# Patient Record
Sex: FEMALE | Race: WHITE | NOT HISPANIC OR LATINO
[De-identification: names, ages, dates, MRNs, and addresses within clinical notes are randomized per-mention and may not be internally consistent; named-entity substitution may affect disease eponyms.]

---

## 2019-04-09 ENCOUNTER — RECORD ABSTRACTING (OUTPATIENT)
Age: 39
End: 2019-04-09

## 2019-04-09 DIAGNOSIS — Z80.49 FAMILY HISTORY OF MALIGNANT NEOPLASM OF OTHER GENITAL ORGANS: ICD-10-CM

## 2019-04-09 DIAGNOSIS — Z87.42 PERSONAL HISTORY OF OTHER DISEASES OF THE FEMALE GENITAL TRACT: ICD-10-CM

## 2019-04-09 DIAGNOSIS — Z78.9 OTHER SPECIFIED HEALTH STATUS: ICD-10-CM

## 2019-04-09 DIAGNOSIS — Z82.0 FAMILY HISTORY OF EPILEPSY AND OTHER DISEASES OF THE NERVOUS SYSTEM: ICD-10-CM

## 2019-04-09 DIAGNOSIS — Z80.8 FAMILY HISTORY OF MALIGNANT NEOPLASM OF OTHER ORGANS OR SYSTEMS: ICD-10-CM

## 2019-04-09 DIAGNOSIS — Z86.69 PERSONAL HISTORY OF OTHER DISEASES OF THE NERVOUS SYSTEM AND SENSE ORGANS: ICD-10-CM

## 2019-04-09 DIAGNOSIS — E66.9 OBESITY, UNSPECIFIED: ICD-10-CM

## 2019-04-09 DIAGNOSIS — Z80.42 FAMILY HISTORY OF MALIGNANT NEOPLASM OF PROSTATE: ICD-10-CM

## 2019-04-11 ENCOUNTER — APPOINTMENT (OUTPATIENT)
Dept: ENDOCRINOLOGY | Facility: CLINIC | Age: 39
End: 2019-04-11
Payer: COMMERCIAL

## 2019-04-11 VITALS
BODY MASS INDEX: 41.95 KG/M2 | WEIGHT: 293 LBS | HEIGHT: 70 IN | HEART RATE: 104 BPM | SYSTOLIC BLOOD PRESSURE: 120 MMHG | DIASTOLIC BLOOD PRESSURE: 82 MMHG

## 2019-04-11 DIAGNOSIS — E28.2 POLYCYSTIC OVARIAN SYNDROME: ICD-10-CM

## 2019-04-11 DIAGNOSIS — E55.9 VITAMIN D DEFICIENCY, UNSPECIFIED: ICD-10-CM

## 2019-04-11 DIAGNOSIS — Z86.32 PERSONAL HISTORY OF GESTATIONAL DIABETES: ICD-10-CM

## 2019-04-11 PROCEDURE — 99214 OFFICE O/P EST MOD 30 MIN: CPT

## 2019-04-11 RX ORDER — ETONOGESTREL AND ETHINYL ESTRADIOL .12; .015 MG/D; MG/D
0.12-0.015 INSERT, EXTENDED RELEASE VAGINAL
Refills: 0 | Status: DISCONTINUED | COMMUNITY
End: 2019-04-11

## 2019-04-11 RX ORDER — ERGOCALCIFEROL 1.25 MG/1
1.25 MG CAPSULE ORAL
Refills: 0 | Status: DISCONTINUED | COMMUNITY
End: 2019-04-11

## 2019-04-11 RX ORDER — METFORMIN ER 750 MG 750 MG/1
750 TABLET ORAL
Refills: 0 | Status: DISCONTINUED | COMMUNITY
End: 2019-04-11

## 2019-04-11 RX ORDER — PEN NEEDLE, DIABETIC 29 G X1/2"
31G X 5 MM NEEDLE, DISPOSABLE MISCELLANEOUS
Qty: 1 | Refills: 1 | Status: ACTIVE | COMMUNITY
Start: 2019-04-11 | End: 1900-01-01

## 2019-04-11 RX ORDER — SPIRONOLACTONE 50 MG/1
50 TABLET ORAL
Refills: 0 | Status: DISCONTINUED | COMMUNITY
End: 2019-04-11

## 2019-04-11 RX ORDER — VITAMIN K2 90 MCG
125 MCG CAPSULE ORAL
Refills: 0 | Status: ACTIVE | COMMUNITY

## 2019-04-11 RX ORDER — PNV/FERROUS SULFATE/FOLIC ACID 27-<0.5MG
TABLET ORAL
Refills: 0 | Status: ACTIVE | COMMUNITY

## 2019-04-11 NOTE — PHYSICAL EXAM
[No Acute Distress] : no acute distress [Normal Sclera/Conjunctiva] : normal sclera/conjunctiva [No Proptosis] : no proptosis [No LAD] : no lymphadenopathy [No Neck Mass] : no neck mass was observed [Thyroid Not Enlarged] : the thyroid was not enlarged [Normal Rate] : heart rate was normal  [No Thyroid Nodules] : there were no palpable thyroid nodules [Normal S1, S2] : normal S1 and S2 [Regular Rhythm] : with a regular rhythm [No Edema] : there was no peripheral edema [Murmurs] : no murmurs [Acanthosis Nigricans] : no acanthosis nigricans [Normal Insight/Judgement] : insight and judgment were intact [Normal Affect] : the affect was normal [de-identified] : Obese female [Normal Mood] : the mood was normal

## 2019-04-11 NOTE — ASSESSMENT
[FreeTextEntry1] : 38 year old female with hx of obesity, PCOS, vitamin D deficiency now presents for reevaluation for possible recurrent gestational diabetes mellitus based on fasting hyperglycemia.\par \par 1.  ?GDM-  I have advised patient to start monitoring fasting and 1 hour postprandial glucose values over the next several days.  I have instructed her to have a standardized bedtime snack containing about 15- 30 grams of CHO to see if any reduction in her fasting glucose occurs.  If her fasting levels are consistently above 95 mg/dl despite dietary modification, then I believe it will be safest to start bedtime therapy with NPH insulin to prevent a large for gestational age baby.  The patient will send me a glucose log in one week.  \par 2.  Vitamin D deficiency-  continue supplement.  Will need repeat level at subsequent visit.\par 3.  PCOS-  will  need repeat hormonal assessment after pregnancy.

## 2019-04-11 NOTE — REVIEW OF SYSTEMS
[Recent Weight Gain (___ Lbs)] : recent [unfilled] ~Ulb weight gain [Blurry Vision] : blurred vision [Polyuria] : polyuria [Fatigue] : no fatigue [Palpitations] : no palpitations [Shortness Of Breath] : no shortness of breath [Polydipsia] : no polydipsia [Nausea] : no nausea

## 2019-04-11 NOTE — CONSULT LETTER
[Dear  ___] : Dear  [unfilled], [Consult Letter:] : I had the pleasure of evaluating your patient, [unfilled]. [Please see my note below.] : Please see my note below. [Consult Closing:] : Thank you very much for allowing me to participate in the care of this patient.  If you have any questions, please do not hesitate to contact me. [Sincerely,] : Sincerely, [FreeTextEntry3] : Francisco Javier Jay MD, FACE\par

## 2019-04-11 NOTE — HISTORY OF PRESENT ILLNESS
[FreeTextEntry1] : Here for a routine follow up visit.  Last seen in 2017 for PCOS.  Was also seen during first pregnancy for suspected Gestational Diabetes.  In the past she failed the 1 hour OGTT with glucose of 154, but did not elect to have 3 hour OGTT done.  She  started monitoring blood glucose and was found to have persistent fasting hyperglycemia so NPH insulin was started.   She is now referred back as her fetus is measuring large for gestational age.   At 25 weeks she had a 1 hour OGTT done which was borderline at 133 mg/dl.  She is currently 30 weeks gestation.  She has been monitoring her fasting glucose and she often has values in the 97- 112 mg/dl range despite modifying her diet.     \par \par Quality:PCOS\par Severity:  moderate\par Duration:  since 2007\par Associated Symptoms:  hirsutism\par Modifying factors:  better with metformin and spironolactone in the past\par \par  \par

## 2019-04-16 RX ORDER — URINE ACETONE TEST STRIPS
STRIP MISCELLANEOUS
Qty: 100 | Refills: 0 | Status: ACTIVE | COMMUNITY
Start: 2019-04-16 | End: 1900-01-01

## 2019-04-16 RX ORDER — URINE ACETONE TEST STRIPS
STRIP MISCELLANEOUS
Qty: 90 | Refills: 0 | Status: ACTIVE | COMMUNITY
Start: 2019-04-16 | End: 1900-01-01

## 2019-04-23 ENCOUNTER — APPOINTMENT (OUTPATIENT)
Dept: ENDOCRINOLOGY | Facility: CLINIC | Age: 39
End: 2019-04-23
Payer: COMMERCIAL

## 2019-04-23 VITALS
SYSTOLIC BLOOD PRESSURE: 118 MMHG | DIASTOLIC BLOOD PRESSURE: 80 MMHG | HEIGHT: 70 IN | WEIGHT: 293 LBS | BODY MASS INDEX: 41.95 KG/M2 | HEART RATE: 92 BPM

## 2019-04-23 LAB — GLUCOSE BLDC GLUCOMTR-MCNC: 136

## 2019-04-23 PROCEDURE — 99213 OFFICE O/P EST LOW 20 MIN: CPT | Mod: 25

## 2019-04-23 PROCEDURE — 82962 GLUCOSE BLOOD TEST: CPT

## 2019-04-23 RX ORDER — BLOOD SUGAR DIAGNOSTIC
STRIP MISCELLANEOUS
Qty: 6 | Refills: 1 | Status: ACTIVE | COMMUNITY
Start: 1900-01-01 | End: 1900-01-01

## 2019-04-23 RX ORDER — LANCETS 28 GAUGE
EACH MISCELLANEOUS
Qty: 6 | Refills: 1 | Status: ACTIVE | COMMUNITY
Start: 1900-01-01 | End: 1900-01-01

## 2019-04-23 RX ORDER — INSULIN LISPRO 100 [IU]/ML
100 INJECTION, SOLUTION INTRAVENOUS; SUBCUTANEOUS
Qty: 1 | Refills: 1 | Status: ACTIVE | COMMUNITY
Start: 2019-04-23 | End: 1900-01-01

## 2019-04-23 NOTE — PHYSICAL EXAM
[No Acute Distress] : no acute distress [No Proptosis] : no proptosis [Normal Sclera/Conjunctiva] : normal sclera/conjunctiva [Normal Affect] : the affect was normal [Normal Insight/Judgement] : insight and judgment were intact [de-identified] : obese female [Normal Mood] : the mood was normal

## 2019-04-23 NOTE — HISTORY OF PRESENT ILLNESS
[FreeTextEntry1] : Here for a routine follow up visit of suspected Gestational Diabetes. Had GDM last pregnancy.  She is now referred back as her fetus is measuring large for gestational age.   At 25 weeks she had a 1 hour OGTT done which was borderline at 133 mg/dl.  Previously followed at this office for PCOS, which was well managed with metformin and sprinolactone.  \par \par Quality:  Gestational DM\par Severity:  mild\par Duration:  recently diagnosed\par Associated Symptoms:  +fatigue \par Modifying factors:  better with diet.\par \par Currently 31 weeks pregnant.  She has difficulty eating large amounts due to gastric bypass.  She has been monitoring fasting and 1 hour PP glucose.  She was having significant morning ketones, but since she increased her dinner time CHO intake to 30 grams, her ketones are now trace to small, but she is having frequent after dinner BG in the 130- 140 mg/dl range.   Fasting values have been in 90 -100 mg/dl range.  \par  \par

## 2019-04-23 NOTE — ASSESSMENT
[FreeTextEntry1] : 38 year old female with hx of obesity, PCOS, vitamin D deficiency now presents with likely GDM on the basis of frequent postprandial hyperglycemia by SMBG.  \par \par 1.  GDM- follow GDM diet.  I will start humalog 4 units with dinner to reduce PP hyperglycemia.  She may require NPH at bedtime if her fasting glucose does not improve.  She will send another glucose log at the end of this week for further titration.   \par \par Spent over 15 minutes with patient, of which over 50% of the time was spent counseling patient on insulin use.

## 2019-04-23 NOTE — REVIEW OF SYSTEMS
[Fatigue] : fatigue [Nausea] : nausea [Palpitations] : no palpitations [Shortness Of Breath] : no shortness of breath

## 2019-04-23 NOTE — CONSULT LETTER
[Dear  ___] : Dear  [unfilled], [Courtesy Letter:] : I had the pleasure of seeing your patient, [unfilled], in my office today. [Consult Closing:] : Thank you very much for allowing me to participate in the care of this patient.  If you have any questions, please do not hesitate to contact me. [Please see my note below.] : Please see my note below. [FreeTextEntry3] : Francisco Javier Jay MD, FACE\par  [Sincerely,] : Sincerely,

## 2019-05-03 ENCOUNTER — MEDICATION RENEWAL (OUTPATIENT)
Age: 39
End: 2019-05-03

## 2019-05-03 RX ORDER — INSULIN HUMAN 100 [IU]/ML
100 INJECTION, SUSPENSION SUBCUTANEOUS AT BEDTIME
Qty: 2 | Refills: 1 | Status: ACTIVE | COMMUNITY
Start: 2019-05-03 | End: 1900-01-01

## 2019-05-14 ENCOUNTER — APPOINTMENT (OUTPATIENT)
Dept: ENDOCRINOLOGY | Facility: CLINIC | Age: 39
End: 2019-05-14
Payer: COMMERCIAL

## 2019-05-14 VITALS
HEART RATE: 99 BPM | DIASTOLIC BLOOD PRESSURE: 70 MMHG | HEIGHT: 70 IN | SYSTOLIC BLOOD PRESSURE: 118 MMHG | BODY MASS INDEX: 41.95 KG/M2 | WEIGHT: 293 LBS

## 2019-05-14 LAB — GLUCOSE BLDC GLUCOMTR-MCNC: 100

## 2019-05-14 PROCEDURE — 99213 OFFICE O/P EST LOW 20 MIN: CPT | Mod: 25

## 2019-05-14 PROCEDURE — 82962 GLUCOSE BLOOD TEST: CPT

## 2019-05-14 NOTE — ASSESSMENT
[FreeTextEntry1] : 38 year old female with hx of obesity, PCOS, vitamin D deficiency now presents with likely GDM on the basis of frequent postprandial hyperglycemia by SMBG.  \par  \par Will  increase NPH to 10 units and bedtime and increase humalog to 12 units with dinner.  Start monitoring 1 hour PP glucose with all meals.     She will send glucose logs on a weekly basis for continued insulin titration and will follow up here in 2 weeks.  \par \par

## 2019-05-14 NOTE — HISTORY OF PRESENT ILLNESS
[FreeTextEntry1] : Here for a routine follow up visit of suspected Gestational Diabetes. Had GDM last pregnancy.  She is now referred back as her fetus is measuring large for gestational age.   At 25 weeks she had a 1 hour OGTT done which was borderline at 133 mg/dl.  Previously followed at this office for PCOS, which was well managed with metformin and sprinolactone.  \par \par Quality:  Gestational DM\par Severity:  mild\par Duration:  recently diagnosed\par Associated Symptoms:  +fatigue \par Modifying factors:  better with diet.\par \par Currently 35 weeks pregnant.  Having  C section June 14 th.  \par Diet is limited due to hx of gastric bypass.   \par She has been lax with 1 hour PP glucose checks over the past week, but since starting NPH insulin at bedtime her fasting BG has improved and is now in the low 90s.  After dinner usually in the low 120s.  \par \par Current Regimen:\par NPH 8 units at HS\par Humalog 10 units with dinner\par

## 2019-05-14 NOTE — CONSULT LETTER
[Dear  ___] : Dear  [unfilled], [Courtesy Letter:] : I had the pleasure of seeing your patient, [unfilled], in my office today. [Please see my note below.] : Please see my note below. [Consult Closing:] : Thank you very much for allowing me to participate in the care of this patient.  If you have any questions, please do not hesitate to contact me. [FreeTextEntry3] : Francisco Javier Jay MD, FACE\par  [Sincerely,] : Sincerely,

## 2019-05-14 NOTE — PHYSICAL EXAM
[No Acute Distress] : no acute distress [Normal Sclera/Conjunctiva] : normal sclera/conjunctiva [No Proptosis] : no proptosis [No Neck Mass] : no neck mass was observed [No LAD] : no lymphadenopathy [Thyroid Not Enlarged] : the thyroid was not enlarged [No Thyroid Nodules] : there were no palpable thyroid nodules [Normal Rate and Effort] : normal respiratory rhythm and effort [Clear to Auscultation] : lungs were clear to auscultation bilaterally [Normal Rate] : heart rate was normal  [Normal S1, S2] : normal S1 and S2 [Normal Insight/Judgement] : insight and judgment were intact [Regular Rhythm] : with a regular rhythm [Murmurs] : no murmurs [Normal Affect] : the affect was normal [de-identified] : Obese female

## 2019-05-14 NOTE — REVIEW OF SYSTEMS
[Fatigue] : fatigue [Shortness Of Breath] : shortness of breath [Anxiety] : anxiety [Nausea] : no nausea

## 2019-05-28 ENCOUNTER — APPOINTMENT (OUTPATIENT)
Dept: ENDOCRINOLOGY | Facility: CLINIC | Age: 39
End: 2019-05-28
Payer: COMMERCIAL

## 2019-05-28 VITALS
BODY MASS INDEX: 41.95 KG/M2 | DIASTOLIC BLOOD PRESSURE: 80 MMHG | HEIGHT: 70 IN | SYSTOLIC BLOOD PRESSURE: 112 MMHG | WEIGHT: 293 LBS | HEART RATE: 100 BPM

## 2019-05-28 DIAGNOSIS — O24.419 GESTATIONAL DIABETES MELLITUS IN PREGNANCY, UNSPECIFIED CONTROL: ICD-10-CM

## 2019-05-28 LAB — GLUCOSE BLDC GLUCOMTR-MCNC: 116

## 2019-05-28 PROCEDURE — 82962 GLUCOSE BLOOD TEST: CPT

## 2019-05-28 PROCEDURE — 99213 OFFICE O/P EST LOW 20 MIN: CPT | Mod: 25

## 2019-05-28 NOTE — CONSULT LETTER
[Dear  ___] : Dear  [unfilled], [Courtesy Letter:] : I had the pleasure of seeing your patient, [unfilled], in my office today. [Sincerely,] : Sincerely, [Consult Closing:] : Thank you very much for allowing me to participate in the care of this patient.  If you have any questions, please do not hesitate to contact me. [Please see my note below.] : Please see my note below. [FreeTextEntry3] : Francisco Javier Jay MD, FACE\par

## 2019-05-28 NOTE — PHYSICAL EXAM
[No Acute Distress] : no acute distress [Normal Sclera/Conjunctiva] : normal sclera/conjunctiva [No Proptosis] : no proptosis [Normal Rate and Effort] : normal respiratory rhythm and effort [Clear to Auscultation] : lungs were clear to auscultation bilaterally [Normal Rate] : heart rate was normal  [Murmurs] : no murmurs [Regular Rhythm] : with a regular rhythm [Normal S1, S2] : normal S1 and S2 [Normal Affect] : the affect was normal [No Edema] : there was no peripheral edema [Normal Insight/Judgement] : insight and judgment were intact [de-identified] : Obese female

## 2019-05-28 NOTE — ASSESSMENT
[FreeTextEntry1] : 38 year old female with hx of obesity, PCOS, vitamin D deficiency now presents with likely GDM on the basis of frequent postprandial hyperglycemia by SMBG.  \par  \par Increase NPH to 12 units qhs and increase pre-dinner humalog to 14 units.  Send glucose log in one week for further dose titration.  \par \par Advised patient to have OGTT and A1c done 2-3  months after delivery.   Will determine appropraite follow up interval based on results of testing.  \par

## 2019-05-28 NOTE — HISTORY OF PRESENT ILLNESS
[FreeTextEntry1] : Here for a routine follow up visit of suspected Gestational Diabetes. Had GDM last pregnancy.  She was now referred back as her fetus was measuring large for gestational age.   At 25 weeks she had a 1 hour OGTT done which was borderline at 133 mg/dl.  Previously followed at this office for PCOS, which was well managed with metformin and sprinolactone.  \par \par Quality:  Gestational DM\par Severity:  mild\par Duration:  recently diagnosed\par Associated Symptoms:  +fatigue \par Modifying factors:  better with diet.\par \par Currently 37 weeks pregnant.  Having  C- section June 14 th.  \par Diet is limited due to hx of gastric bypass.   \par \par Current Regimen:\par NPH 10 units at HS\par Humalog 12 units with dinner\par \par SMBG:  Has been missing some after breakfast and after lunch readings.\par Fasting BG in the 92- 98 range and after dinner in the 113- 129 range.\par \par Fetus is measuring in 73rd percentile. \par

## 2019-05-28 NOTE — REVIEW OF SYSTEMS
[Fatigue] : fatigue [Nocturia] : nocturia [Palpitations] : no palpitations [Shortness Of Breath] : no shortness of breath [Nausea] : no nausea [Polydipsia] : no polydipsia

## 2019-06-06 ENCOUNTER — MOBILE ON CALL (OUTPATIENT)
Age: 39
End: 2019-06-06

## 2020-05-22 ENCOUNTER — TRANSCRIPTION ENCOUNTER (OUTPATIENT)
Age: 40
End: 2020-05-22

## 2021-08-26 ENCOUNTER — TRANSCRIPTION ENCOUNTER (OUTPATIENT)
Age: 41
End: 2021-08-26

## 2024-08-07 ENCOUNTER — OFFICE (OUTPATIENT)
Dept: URBAN - METROPOLITAN AREA CLINIC 113 | Facility: CLINIC | Age: 44
Setting detail: OPHTHALMOLOGY
End: 2024-08-07
Payer: COMMERCIAL

## 2024-08-07 DIAGNOSIS — E11.9: ICD-10-CM

## 2024-08-07 DIAGNOSIS — H35.033: ICD-10-CM

## 2024-08-07 PROBLEM — H17.9 CORNEAL SCAR: Status: ACTIVE | Noted: 2024-08-07

## 2024-08-07 PROCEDURE — 92250 FUNDUS PHOTOGRAPHY W/I&R: CPT | Performed by: OPHTHALMOLOGY

## 2024-08-07 PROCEDURE — 92004 COMPRE OPH EXAM NEW PT 1/>: CPT | Performed by: OPHTHALMOLOGY

## 2024-08-07 ASSESSMENT — CONFRONTATIONAL VISUAL FIELD TEST (CVF)
OD_FINDINGS: FULL
OS_FINDINGS: FULL